# Patient Record
Sex: MALE | Race: WHITE | HISPANIC OR LATINO | Employment: FULL TIME | URBAN - METROPOLITAN AREA
[De-identification: names, ages, dates, MRNs, and addresses within clinical notes are randomized per-mention and may not be internally consistent; named-entity substitution may affect disease eponyms.]

---

## 2022-10-07 ENCOUNTER — OFFICE VISIT (OUTPATIENT)
Dept: INTERNAL MEDICINE | Facility: CLINIC | Age: 27
End: 2022-10-07

## 2022-10-07 VITALS
OXYGEN SATURATION: 99 % | TEMPERATURE: 98.1 F | WEIGHT: 224.3 LBS | HEART RATE: 71 BPM | BODY MASS INDEX: 32.11 KG/M2 | HEIGHT: 70 IN | SYSTOLIC BLOOD PRESSURE: 126 MMHG | DIASTOLIC BLOOD PRESSURE: 66 MMHG

## 2022-10-07 DIAGNOSIS — E78.5 HYPERLIPIDEMIA, UNSPECIFIED HYPERLIPIDEMIA TYPE: ICD-10-CM

## 2022-10-07 DIAGNOSIS — Z00.00 ANNUAL PHYSICAL EXAM: ICD-10-CM

## 2022-10-07 DIAGNOSIS — Z76.89 ESTABLISHING CARE WITH NEW DOCTOR, ENCOUNTER FOR: Primary | ICD-10-CM

## 2022-10-07 DIAGNOSIS — G44.82 HEADACHE ASSOCIATED WITH SEXUAL ACTIVITY: ICD-10-CM

## 2022-10-07 PROCEDURE — 99385 PREV VISIT NEW AGE 18-39: CPT | Performed by: NURSE PRACTITIONER

## 2022-10-07 PROCEDURE — 99213 OFFICE O/P EST LOW 20 MIN: CPT | Performed by: NURSE PRACTITIONER

## 2022-10-07 NOTE — ASSESSMENT & PLAN NOTE
Patient reported symptoms consistent with sex headaches.  We will go ahead and refer to neurology for further evaluation and management options.  Patient has never been on maintenance medication previously.  Has never had his headaches worked up significantly.  He wishes to do so at this time.  We will follow along and assist as needed.  I did encourage him to go ahead and refrain from sexual activity, and see if this assist in his headache frequency.  Advised of Motrin dosing, and to use as needed for headaches.  Patient agrees and understands with plan.

## 2022-10-07 NOTE — PROGRESS NOTES
Chief Complaint  Headache and Establish Care    Subjective        Sánchez Hawk presents to [facility] [place] for History of Present Illness  Establishment of care and to discuss headaches.    Patient is coming to establish care with new primary care provider, patient has not had any primary care since he was a teenager.  He was previously seen by PCP [place], as he was a  dependent.  Patient reports that as a teenager he did not have any significant medical problems, he did have some headaches, but never formally diagnosed with migraines.  He was able to use over-the-counter medications to treat those.  He denies any other significant medical problems.  He does not take any medications currently.  He does not have any known allergies.    Patient does have acute concern today regarding his headaches.  He states that over the last 2 weeks he is noticed an increase in headaches, primarily during and after sex.  He states that when he nears orgasm he notices tension in the back of his head start, and if he does orgasm then he gets a full-blown headache.  Sometimes the headaches last just a couple of hours, and then sometimes 2 to 3 days.  He had never noticed this prior to 2 weeks ago in association with sexual activity.  When asked about experiences with masturbation, he had never experienced headaches with masturbation as well prior to this point.  He has had 1 episode where he noticed that with masturbation since headache started back.  Patient can use over-the-counter Motrin, which does help alleviate the headaches.  He sometimes notices a little bit of blurred vision in his peripheral before the beginning of a headache.  He was concerned as these started abruptly, and they have happen frequently.  He had never been fully evaluated for headaches in the past, and would like to do so at this time.    Patient does report that he had some lab work done for preemployment physical in  "March, he had elevated cholesterol at that time.  Wanted to discuss that as well today.    Otherwise, patient does not have any significant concerns or complaints today.       Objective   Vital Signs:   /66 (BP Location: Right arm, Patient Position: Sitting, Cuff Size: Large Adult)   Pulse 71   Temp 98.1 °F (36.7 °C) (Temporal)   Ht 177.8 cm (70\")   Wt 102 kg (224 lb 4.8 oz)   SpO2 99%   BMI 32.18 kg/m²     Physical Exam  Vitals and nursing note reviewed.   Constitutional:       Appearance: Normal appearance.   HENT:      Head: Normocephalic and atraumatic.      Right Ear: Tympanic membrane, ear canal and external ear normal.      Left Ear: Tympanic membrane, ear canal and external ear normal.      Nose: Nose normal.      Mouth/Throat:      Mouth: Mucous membranes are moist.      Pharynx: Oropharynx is clear.   Eyes:      Conjunctiva/sclera: Conjunctivae normal.      Pupils: Pupils are equal, round, and reactive to light.   Cardiovascular:      Rate and Rhythm: Normal rate and regular rhythm.   Pulmonary:      Effort: Pulmonary effort is normal.      Breath sounds: Normal breath sounds.   Musculoskeletal:         General: Normal range of motion.      Cervical back: Normal range of motion and neck supple.   Lymphadenopathy:      Cervical: No cervical adenopathy.   Neurological:      Mental Status: He is alert.   Psychiatric:         Mood and Affect: Mood normal.         Thought Content: Thought content normal.         Judgment: Judgment normal.        Result Review :  {The following data was reviewed by NIKOLAS Ron on 10/07/22                Diagnoses and all orders for this visit:    1. Establishing care with new doctor, encounter for (Primary)    2. Annual physical exam  Assessment & Plan:  Screening labs reviewed/ordered  Counseling provided regarding age appropriate screenings and immunizations, healthy diet and exercise.         3. Hyperlipidemia, unspecified hyperlipidemia type  Assessment & " Plan:  Discussed hyperlipidemia, discussed diet and exercise, will recheck at his next annual exam.      4. Headache associated with sexual activity  Assessment & Plan:  Patient reported symptoms consistent with sex headaches.  We will go ahead and refer to neurology for further evaluation and management options.  Patient has never been on maintenance medication previously.  Has never had his headaches worked up significantly.  He wishes to do so at this time.  We will follow along and assist as needed.  I did encourage him to go ahead and refrain from sexual activity, and see if this assist in his headache frequency.  Advised of Motrin dosing, and to use as needed for headaches.  Patient agrees and understands with plan.    Orders:  -     Ambulatory Referral to Neurology        Follow Up   Return in about 1 year (around 10/7/2023) for Annual physical.  Patient was given instructions and counseling regarding his condition or for health maintenance advice. Please see specific information pulled into the AVS if appropriate.     NIKOLAS Ron  10/7/2022  This note was electronically signed.

## 2022-10-19 ENCOUNTER — HOSPITAL ENCOUNTER (OUTPATIENT)
Dept: CT IMAGING | Facility: HOSPITAL | Age: 27
Discharge: HOME | End: 2022-10-19
Admitting: NURSE PRACTITIONER

## 2022-10-19 ENCOUNTER — OFFICE VISIT (OUTPATIENT)
Dept: NEUROLOGY | Facility: CLINIC | Age: 27
End: 2022-10-19

## 2022-10-19 VITALS
HEART RATE: 65 BPM | SYSTOLIC BLOOD PRESSURE: 129 MMHG | BODY MASS INDEX: 31.97 KG/M2 | DIASTOLIC BLOOD PRESSURE: 77 MMHG | WEIGHT: 223.3 LBS | HEIGHT: 70 IN

## 2022-10-19 DIAGNOSIS — G44.82 HEADACHE ASSOCIATED WITH SEXUAL ACTIVITY: Primary | ICD-10-CM

## 2022-10-19 DIAGNOSIS — G44.82 HEADACHE ASSOCIATED WITH SEXUAL ACTIVITY: ICD-10-CM

## 2022-10-19 PROCEDURE — 99214 OFFICE O/P EST MOD 30 MIN: CPT | Performed by: NURSE PRACTITIONER

## 2022-10-19 PROCEDURE — 0 IOPAMIDOL PER 1 ML: Performed by: NURSE PRACTITIONER

## 2022-10-19 PROCEDURE — 70498 CT ANGIOGRAPHY NECK: CPT

## 2022-10-19 PROCEDURE — 70496 CT ANGIOGRAPHY HEAD: CPT

## 2022-10-19 RX ADMIN — IOPAMIDOL 100 ML: 755 INJECTION, SOLUTION INTRAVENOUS at 09:57

## 2022-10-19 NOTE — ASSESSMENT & PLAN NOTE
Will order stat CTA head/neck for further evaluation of new onset thunderclap headache with sexual activity/physical exertion.  Aneurysm is in the differential.  If imaging normal, will start Indomethacin BID for treatment of primary exertional headache.

## 2022-10-19 NOTE — PROGRESS NOTES
"Chief Complaint  Neurologic Problem    Subjective          Sánchez Hawk presents to [facility]  History of Present Illness  States he began having severe headache with intercourse and orgasm in Sept.  Was in right posterior head region.  Worsened in the next two weeks.  Has been abstaining from sex since.  Endorses headache now that has began with vigorous physical activity.  History of headaches as a teenager, but none since before the last few weeks.       Objective   Vital Signs:   /77   Pulse 65   Ht 177.8 cm (70\")   Wt 101 kg (223 lb 4.8 oz)   BMI 32.04 kg/m²     Physical Exam  HENT:      Head: Normocephalic.   Pulmonary:      Effort: Pulmonary effort is normal.   Neurological:      Mental Status: He is alert and oriented to person, place, and time.      Cranial Nerves: Cranial nerves are intact.      Sensory: Sensation is intact.      Motor: Motor function is intact.      Coordination: Coordination is intact.      Deep Tendon Reflexes: Reflexes are normal and symmetric.        Neurologic Exam     Mental Status   Oriented to person, place, and time.        Result Review :               Assessment and Plan    Diagnoses and all orders for this visit:    1. Headache associated with sexual activity (Primary)  Assessment & Plan:  Will order stat CTA head/neck for further evaluation of new onset thunderclap headache with sexual activity/physical exertion.  Aneurysm is in the differential.  If imaging normal, will start Indomethacin BID for treatment of primary exertional headache.     Orders:  -     CT Angiogram Head; Future  -     CT Angiogram Neck With & Without Contrast; Future      Follow Up   Return in about 3 months (around 1/19/2023) for Migraine f/u.  Patient was given instructions and counseling regarding his condition or for health maintenance advice. Please see specific information pulled into the AVS if appropriate.       "

## 2022-10-20 RX ORDER — INDOMETHACIN 50 MG/1
50 CAPSULE ORAL 2 TIMES DAILY WITH MEALS
Qty: 60 CAPSULE | Refills: 3 | Status: SHIPPED | OUTPATIENT
Start: 2022-10-20 | End: 2022-11-19

## 2022-10-20 RX ORDER — OMEPRAZOLE 20 MG/1
20 CAPSULE, DELAYED RELEASE ORAL DAILY
Qty: 30 CAPSULE | Refills: 3 | Status: SHIPPED | OUTPATIENT
Start: 2022-10-20 | End: 2023-10-20

## 2022-10-20 NOTE — PROGRESS NOTES
CTA head/neck normal.  I have called in indomethacin BID and omeprazole like we discussed in his appt yesterday to treat his exertional headache

## 2022-10-21 ENCOUNTER — PATIENT MESSAGE (OUTPATIENT)
Dept: NEUROLOGY | Facility: CLINIC | Age: 27
End: 2022-10-21

## 2022-10-21 ENCOUNTER — TELEPHONE (OUTPATIENT)
Dept: NEUROLOGY | Facility: CLINIC | Age: 27
End: 2022-10-21

## 2022-10-21 NOTE — PROGRESS NOTES
Attempted to contact patient with CTA results and to advise of medications sent to pharmacy (per Paula). No answer; left voicemail for patient to call officel

## 2022-10-31 RX ORDER — INDOMETHACIN 50 MG/1
50 CAPSULE ORAL 2 TIMES DAILY WITH MEALS
Qty: 60 CAPSULE | Refills: 3 | OUTPATIENT
Start: 2022-10-31 | End: 2022-11-30

## 2022-10-31 RX ORDER — OMEPRAZOLE 20 MG/1
20 CAPSULE, DELAYED RELEASE ORAL DAILY
Qty: 30 CAPSULE | Refills: 3 | OUTPATIENT
Start: 2022-10-31 | End: 2023-10-31

## 2023-01-31 ENCOUNTER — OFFICE VISIT (OUTPATIENT)
Dept: NEUROLOGY | Facility: CLINIC | Age: 28
End: 2023-01-31
Payer: COMMERCIAL

## 2023-01-31 VITALS
HEIGHT: 70 IN | HEART RATE: 82 BPM | DIASTOLIC BLOOD PRESSURE: 65 MMHG | SYSTOLIC BLOOD PRESSURE: 122 MMHG | WEIGHT: 233.8 LBS | BODY MASS INDEX: 33.47 KG/M2

## 2023-01-31 DIAGNOSIS — G44.84 PRIMARY EXERTIONAL HEADACHE: Primary | ICD-10-CM

## 2023-01-31 PROBLEM — G44.82 HEADACHE ASSOCIATED WITH SEXUAL ACTIVITY: Status: RESOLVED | Noted: 2022-10-07 | Resolved: 2023-01-31

## 2023-01-31 PROCEDURE — 99213 OFFICE O/P EST LOW 20 MIN: CPT | Performed by: NURSE PRACTITIONER

## 2023-01-31 RX ORDER — INDOMETHACIN 50 MG/1
50 CAPSULE ORAL 2 TIMES DAILY WITH MEALS
COMMUNITY
Start: 2022-11-27

## 2023-01-31 NOTE — ASSESSMENT & PLAN NOTE
Discussed that headache is likely resolved with indomethacin treatment.  Will discontinue both indocin and omeprazole. He will notify the office if headaches return.

## 2023-01-31 NOTE — PROGRESS NOTES
"Chief Complaint  No chief complaint on file.    Subjective          Sánchez Hawk presents to [facility]  History of Present Illness  States that headaches have resolved with indomethacin.  However he was experiencing significant fatigue with it and discontinued two weeks ago. Headaches have remained resolved.     Interval History:  States he began having severe headache with intercourse and orgasm in Sept.  Was in right posterior head region.  Worsened in the next two weeks.  Has been abstaining from sex since.  Endorses headache now that has began with vigorous physical activity.  History of headaches as a teenager, but none since before the last few weeks.       Objective   Vital Signs:   /65   Pulse 82   Ht 177.8 cm (70\")   Wt 106 kg (233 lb 12.8 oz)   BMI 33.55 kg/m²     Physical Exam  HENT:      Head: Normocephalic.   Pulmonary:      Effort: Pulmonary effort is normal.   Neurological:      Mental Status: He is alert and oriented to person, place, and time.      Sensory: Sensation is intact.      Motor: Motor function is intact.      Coordination: Coordination is intact.      Deep Tendon Reflexes: Reflexes are normal and symmetric.        Neurologic Exam     Mental Status   Oriented to person, place, and time.        Result Review :             CTA Head: normal  CTA neck: Normal     Assessment and Plan    Diagnoses and all orders for this visit:    1. Primary exertional headache (Primary)  Assessment & Plan:  Discussed that headache is likely resolved with indomethacin treatment.  Will discontinue both indocin and omeprazole. He will notify the office if headaches return.          Follow Up   Return if symptoms worsen or fail to improve.  Patient was given instructions and counseling regarding his condition or for health maintenance advice. Please see specific information pulled into the AVS if appropriate.       "

## 2023-06-19 ENCOUNTER — OFFICE VISIT (OUTPATIENT)
Dept: INTERNAL MEDICINE | Facility: CLINIC | Age: 28
End: 2023-06-19
Payer: COMMERCIAL

## 2023-06-19 VITALS
OXYGEN SATURATION: 98 % | TEMPERATURE: 97.4 F | DIASTOLIC BLOOD PRESSURE: 80 MMHG | WEIGHT: 222 LBS | SYSTOLIC BLOOD PRESSURE: 128 MMHG | HEIGHT: 70 IN | BODY MASS INDEX: 31.78 KG/M2 | HEART RATE: 58 BPM

## 2023-06-19 DIAGNOSIS — R05.1 ACUTE COUGH: Primary | ICD-10-CM

## 2023-06-19 LAB
EXPIRATION DATE: NORMAL
INTERNAL CONTROL: NORMAL
Lab: NORMAL
S PYO AG THROAT QL: NEGATIVE

## 2023-06-19 PROCEDURE — 99213 OFFICE O/P EST LOW 20 MIN: CPT | Performed by: PHYSICIAN ASSISTANT

## 2023-06-19 PROCEDURE — 87880 STREP A ASSAY W/OPTIC: CPT | Performed by: PHYSICIAN ASSISTANT

## 2023-06-19 RX ORDER — OMEPRAZOLE 20 MG/1
20 CAPSULE, DELAYED RELEASE ORAL DAILY
Qty: 30 CAPSULE | Refills: 3 | Status: SHIPPED | OUTPATIENT
Start: 2023-06-19 | End: 2024-06-18

## 2023-06-19 RX ORDER — CETIRIZINE HYDROCHLORIDE 10 MG/1
10 TABLET ORAL DAILY
Qty: 30 TABLET | Refills: 2 | Status: SHIPPED | OUTPATIENT
Start: 2023-06-19 | End: 2023-07-19

## 2023-06-19 NOTE — ASSESSMENT & PLAN NOTE
Etiologies could include acute URI, GERD, allergic reaction.  Will go ahead and get chest x-ray in office today to rule out possible pneumonia or other etiologies.  If chest x-ray negative will likely have patient start omeprazole and Zyrtec as needed.  Call for any questions or concerns.

## 2023-06-19 NOTE — PROGRESS NOTES
"Chief Complaint  Cough (X 8 days )    Subjective          Sánchez Hawk presents to [facility]    Coughing- symptoms started initially about 8 days ago.  The cough seems to have worsened over the past few days.  He has had associated sore throat as well.  No fevers, congestion or difficulty breathing.  He has taken some Robitussin which does not seem to help.  Patient states that the cough is worse in the morning and feels like he has something stuck in his throat which usually clears but only after he coughs so much and vomits.  This just happened the last few days.  Insignificant history of asthma as a child.  No sick contacts that he is aware of, the patient's significant other was around sister who had strep a few weeks ago.    Objective   Vital Signs:   /80   Pulse 58   Temp 97.4 °F (36.3 °C)   Ht 177.8 cm (70\")   Wt 101 kg (222 lb)   SpO2 98%   BMI 31.85 kg/m²     Physical Exam  Vitals reviewed.   Constitutional:       Appearance: Normal appearance. He is well-developed.   HENT:      Head: Normocephalic and atraumatic.      Right Ear: Tympanic membrane, ear canal and external ear normal.      Left Ear: Tympanic membrane, ear canal and external ear normal.      Nose: Nose normal.      Mouth/Throat:      Mouth: Mucous membranes are moist.      Pharynx: No posterior oropharyngeal erythema.   Eyes:      Conjunctiva/sclera: Conjunctivae normal.      Pupils: Pupils are equal, round, and reactive to light.   Cardiovascular:      Rate and Rhythm: Normal rate and regular rhythm.      Heart sounds: No murmur heard.    No friction rub. No gallop.   Pulmonary:      Effort: Pulmonary effort is normal.      Breath sounds: Normal breath sounds. No wheezing or rhonchi.   Skin:     General: Skin is warm and dry.   Neurological:      Mental Status: He is alert and oriented to person, place, and time.      Cranial Nerves: No cranial nerve deficit.   Psychiatric:    "      Mood and Affect: Mood and affect normal.         Behavior: Behavior normal.         Thought Content: Thought content normal.         Judgment: Judgment normal.      Result Review :          Procedures      Assessment and Plan    Diagnoses and all orders for this visit:    1. Acute cough (Primary)  Assessment & Plan:  Etiologies could include acute URI, GERD, allergic reaction.  Will go ahead and get chest x-ray in office today to rule out possible pneumonia or other etiologies.  If chest x-ray negative will likely have patient start omeprazole and Zyrtec as needed.  Call for any questions or concerns.    Orders:  -     XR Chest PA & Lateral (In Office)  -     POCT rapid strep A    Other orders  -     omeprazole (priLOSEC) 20 MG capsule; Take 1 capsule by mouth Daily.  Dispense: 30 capsule; Refill: 3  -     cetirizine (zyrTEC) 10 MG tablet; Take 1 tablet by mouth Daily for 30 days.  Dispense: 30 tablet; Refill: 2              Follow Up   No follow-ups on file.  Patient was given instructions and counseling regarding his condition or for health maintenance advice. Please see specific information pulled into the AVS if appropriate.

## 2025-08-08 ENCOUNTER — TRANSCRIBE ORDERS (OUTPATIENT)
Dept: ADMINISTRATIVE | Facility: HOSPITAL | Age: 30
End: 2025-08-08
Payer: COMMERCIAL

## 2025-08-08 DIAGNOSIS — M25.512 LEFT SHOULDER PAIN, UNSPECIFIED CHRONICITY: Primary | ICD-10-CM
